# Patient Record
Sex: FEMALE | Race: WHITE | NOT HISPANIC OR LATINO | Employment: OTHER | URBAN - METROPOLITAN AREA
[De-identification: names, ages, dates, MRNs, and addresses within clinical notes are randomized per-mention and may not be internally consistent; named-entity substitution may affect disease eponyms.]

---

## 2020-03-11 ENCOUNTER — HISTORICAL (OUTPATIENT)
Dept: CARDIOLOGY | Facility: HOSPITAL | Age: 70
End: 2020-03-11

## 2023-02-09 ENCOUNTER — OFFICE VISIT (OUTPATIENT)
Dept: URGENT CARE | Facility: CLINIC | Age: 73
End: 2023-02-09
Payer: MEDICARE

## 2023-02-09 ENCOUNTER — TELEPHONE (OUTPATIENT)
Dept: URGENT CARE | Facility: CLINIC | Age: 73
End: 2023-02-09

## 2023-02-09 VITALS
OXYGEN SATURATION: 97 % | HEART RATE: 60 BPM | HEIGHT: 60 IN | DIASTOLIC BLOOD PRESSURE: 90 MMHG | SYSTOLIC BLOOD PRESSURE: 183 MMHG | WEIGHT: 152 LBS | BODY MASS INDEX: 29.84 KG/M2 | TEMPERATURE: 98 F | RESPIRATION RATE: 18 BRPM

## 2023-02-09 DIAGNOSIS — M54.9 BACK PAIN, UNSPECIFIED BACK LOCATION, UNSPECIFIED BACK PAIN LATERALITY, UNSPECIFIED CHRONICITY: Primary | ICD-10-CM

## 2023-02-09 DIAGNOSIS — R10.32 LEFT LOWER QUADRANT ABDOMINAL PAIN: ICD-10-CM

## 2023-02-09 LAB
BILIRUB UR QL STRIP: NEGATIVE
GLUCOSE UR QL STRIP: NEGATIVE
KETONES UR QL STRIP: NEGATIVE
LEUKOCYTE ESTERASE UR QL STRIP: NEGATIVE
OTHER: NEGATIVE
PH, POC UA: 6
POC BLOOD, URINE: NEGATIVE
POC NITRATES, URINE: NEGATIVE
PROT UR QL STRIP: NEGATIVE
SP GR UR STRIP: 1 (ref 1–1.03)
UROBILINOGEN UR STRIP-ACNC: NORMAL (ref 0.1–1.1)

## 2023-02-09 PROCEDURE — 99202 OFFICE O/P NEW SF 15 MIN: CPT | Mod: ,,, | Performed by: PHYSICIAN ASSISTANT

## 2023-02-09 PROCEDURE — 81003 POCT URINALYSIS, DIPSTICK, AUTOMATED, W/O SCOPE: ICD-10-PCS | Mod: QW,,, | Performed by: PHYSICIAN ASSISTANT

## 2023-02-09 PROCEDURE — 81003 URINALYSIS AUTO W/O SCOPE: CPT | Mod: QW,,, | Performed by: PHYSICIAN ASSISTANT

## 2023-02-09 PROCEDURE — 99202 PR OFFICE/OUTPT VISIT, NEW, LEVL II, 15-29 MIN: ICD-10-PCS | Mod: ,,, | Performed by: PHYSICIAN ASSISTANT

## 2023-02-09 RX ORDER — MECLIZINE HCL 12.5 MG 12.5 MG/1
12.5 TABLET ORAL 3 TIMES DAILY
COMMUNITY
Start: 2022-12-28

## 2023-02-09 RX ORDER — FLECAINIDE ACETATE 50 MG/1
50 TABLET ORAL 2 TIMES DAILY
COMMUNITY
Start: 2023-02-05

## 2023-02-09 RX ORDER — AMLODIPINE BESYLATE 5 MG/1
5 TABLET ORAL
COMMUNITY
Start: 2022-12-04

## 2023-02-09 RX ORDER — METOPROLOL TARTRATE 25 MG/1
12.5 TABLET, FILM COATED ORAL 2 TIMES DAILY
COMMUNITY
Start: 2023-02-05

## 2023-02-09 RX ORDER — ATORVASTATIN CALCIUM 80 MG/1
80 TABLET, FILM COATED ORAL
COMMUNITY
Start: 2022-12-18

## 2023-02-09 RX ORDER — ASPIRIN 81 MG/1
81 TABLET ORAL DAILY
COMMUNITY

## 2023-02-09 NOTE — PATIENT INSTRUCTIONS
Recommend go to emergency department today for further evaluation concern for left lower quadrant abdominal pain further evaluation blood work repeat urinalysis and potential imaging CT scan further care planning.

## 2023-02-09 NOTE — PROGRESS NOTES
Subjective:       Patient ID: Deana Rivers is a 72 y.o. female.    Vitals:  height is 5' (1.524 m) and weight is 68.9 kg (152 lb). Her oral temperature is 97.9 °F (36.6 °C). Her blood pressure is 183/90 (abnormal) and her pulse is 60. Her respiration is 18 and oxygen saturation is 97%.     Chief Complaint: No chief complaint on file.    HPI  patient reports in the last 7 days having left lower quadrant pain now worsening overnight difficulty resting waking up with severe sharp left lower quadrant pain radiating to left flank presents to urgent Care for initial evaluation.  Patient reports recent constipation from new salad diet denies recent antibiotic use nausea vomiting or bloody stools.  Patient reports increasing water intake in having urinary frequency without dysuria hematuria.  Patient reports history of kidney stones currently out of hydrocodone and spasm medication   Back Pain     Additional comments: Pt has history of kidney stones. Pt states she has   left lower abdominal pain radiating around to her left lower back.       Constitution: Negative for chills and fever.   Cardiovascular:  Negative for chest pain.   Gastrointestinal:  Positive for abdominal pain and constipation. Negative for nausea, vomiting, diarrhea, bright red blood in stool, dark colored stools and rectal bleeding.   Genitourinary:  Positive for frequency, flank pain and history of kidney stones. Negative for dysuria, urine decreased, bladder incontinence, hematuria and pelvic pain.   Musculoskeletal:  Positive for back pain. Negative for trauma.   Skin: Negative.  Negative for erythema.   Neurological: Negative.    Psychiatric/Behavioral:  Positive for sleep disturbance.      Objective:      Physical Exam   Constitutional: She is oriented to person, place, and time. She appears well-developed. She is cooperative.  Non-toxic appearance.      Comments:Awake alert pleasant ambulatory female     HENT:   Head: Normocephalic.    Mouth/Throat: Oropharynx is clear and moist and mucous membranes are normal.   Eyes: Lids are normal.   Neck: Trachea normal and phonation normal. Neck supple.   Cardiovascular: Normal rate, regular rhythm, normal heart sounds and normal pulses.   Pulmonary/Chest: Effort normal and breath sounds normal. No respiratory distress.   Abdominal: Bowel sounds are normal. She exhibits no distension and no mass. Soft. There is abdominal tenderness. There is guarding. There is no left CVA tenderness and no right CVA tenderness.      Comments: Severe anterior left lower quadrant pain on palpation   Musculoskeletal: Normal range of motion.         General: Normal range of motion.   Neurological: no focal deficit. She is alert and oriented to person, place, and time. She has normal strength and normal reflexes.   Skin: Skin is warm, dry, intact, not diaphoretic, not pale and no rash. No erythema   Psychiatric: Her speech is normal and behavior is normal. Mood, judgment and thought content normal.   Nursing note and vitals reviewed.         Previous History      Review of patient's allergies indicates:   Allergen Reactions    Ace inhibitors      Other reaction(s): Head ache    Clindamycin Hives    Tetanus vaccines and toxoid      Other reaction(s): Swelling       Past Medical History:   Diagnosis Date    Arrhythmia     Hyperlipidemia     Hypertension     Vertigo      Current Outpatient Medications   Medication Instructions    amLODIPine (NORVASC) 5 mg, Oral    aspirin (ECOTRIN) 81 mg, Oral, Daily    atorvastatin (LIPITOR) 80 mg, Oral    flecainide (TAMBOCOR) 50 mg, Oral, 2 times daily    meclizine (ANTIVERT) 12.5 mg, Oral, 3 times daily    metoprolol tartrate (LOPRESSOR) 12.5 mg, Oral, 2 times daily     Past Surgical History:   Procedure Laterality Date    ADENOIDECTOMY      APPENDECTOMY      BREAST BIOPSY Bilateral     CHOLECYSTECTOMY      HYSTERECTOMY      KIDNEY STONE SURGERY Right     KNEE SURGERY Left      TONSILLECTOMY       Family History   Problem Relation Age of Onset    Heart attack Mother     Colon cancer Father        Social History     Tobacco Use    Smoking status: Never    Smokeless tobacco: Never        Physical Exam      Vital Signs Reviewed   BP (!) 183/90   Pulse 60   Temp 97.9 °F (36.6 °C) (Oral)   Resp 18   Ht 5' (1.524 m)   Wt 68.9 kg (152 lb)   SpO2 97%   BMI 29.69 kg/m²        Procedures    Procedures     Labs     Results for orders placed or performed in visit on 02/09/23   POCT Urinalysis, Dipstick, Automated, W/O Scope   Result Value Ref Range    POC Blood, Urine Negative Negative    POC Bilirubin, Urine Negative Negative    POC Urobilinogen, Urine Normal 0.1 - 1.1    POC Ketones, Urine Negative Negative    POC Protein, Urine Negative Negative    POC Nitrates, Urine Negative Negative    POC Glucose, Urine Negative Negative    pH, UA 6     POC Specific Gravity, Urine 1.005 1.003 - 1.029    POC Leukocytes, Urine Negative Negative    OTHER Negative        Assessment:       1. Back pain, unspecified back location, unspecified back pain laterality, unspecified chronicity    2. Left lower quadrant abdominal pain            Plan:     Patient AAOx4 ambulatory since concern for left lower quadrant abdominal pain unclear gastrointestinal versus genitourinary with no acute gross findings on urinalysis in clinic today.  Patient encouraged emergency department evaluation today for further testing and potential imaging.  Patient desires to travel private vehicle to [facility] emergency department for further evaluation.    Recommend go to emergency department today for further evaluation concern for left lower quadrant abdominal pain further evaluation blood work repeat urinalysis and potential imaging CT scan further care planning.  Back pain, unspecified back location, unspecified back pain laterality, unspecified chronicity  -     POCT Urinalysis, Dipstick, Automated, W/O Scope    Left lower  quadrant abdominal pain

## 2023-02-09 NOTE — TELEPHONE ENCOUNTER
Patient called the clinic back shortly after she left stating she spoke with her  who got a recommendation for a urologist. She wanted us to send a referral but after speaking with out provider in house, he will not send the referral because he has already instructed the patient to go to the E.R today. I relayed the message to the patient and she said if it gets worse I will go to the E.R. At this point, I reiterated the instructions to go to the E.R and she acknowledged it and had no further questions.

## 2025-05-19 DIAGNOSIS — G43.909 MIGRAINE: Primary | ICD-10-CM

## 2025-06-09 ENCOUNTER — OFFICE VISIT (OUTPATIENT)
Dept: URGENT CARE | Facility: CLINIC | Age: 75
End: 2025-06-09
Payer: MEDICARE

## 2025-06-09 VITALS
SYSTOLIC BLOOD PRESSURE: 139 MMHG | OXYGEN SATURATION: 96 % | RESPIRATION RATE: 20 BRPM | HEIGHT: 60 IN | WEIGHT: 143 LBS | HEART RATE: 74 BPM | TEMPERATURE: 98 F | BODY MASS INDEX: 28.07 KG/M2 | DIASTOLIC BLOOD PRESSURE: 66 MMHG

## 2025-06-09 DIAGNOSIS — R05.9 COUGH, UNSPECIFIED TYPE: Primary | ICD-10-CM

## 2025-06-09 LAB
CTP QC/QA: YES
CTP QC/QA: YES
POC MOLECULAR INFLUENZA A AGN: NEGATIVE
POC MOLECULAR INFLUENZA B AGN: NEGATIVE
SARS-COV+SARS-COV-2 AG RESP QL IA.RAPID: NEGATIVE

## 2025-06-09 PROCEDURE — 87502 INFLUENZA DNA AMP PROBE: CPT | Mod: QW,,, | Performed by: FAMILY MEDICINE

## 2025-06-09 PROCEDURE — 99213 OFFICE O/P EST LOW 20 MIN: CPT | Mod: ,,, | Performed by: FAMILY MEDICINE

## 2025-06-09 PROCEDURE — 87811 SARS-COV-2 COVID19 W/OPTIC: CPT | Mod: QW,,, | Performed by: FAMILY MEDICINE

## 2025-06-09 RX ORDER — CEPHALEXIN 500 MG/1
500 CAPSULE ORAL EVERY 8 HOURS
Qty: 21 CAPSULE | Refills: 0 | Status: SHIPPED | OUTPATIENT
Start: 2025-06-09 | End: 2025-06-16

## 2025-06-09 NOTE — PATIENT INSTRUCTIONS
Cough:  COVID and flu test are negative  Continue over-the-counter Delsym  Hydrate, increase fluid intake  Rest until you feel better  Call or seek medical attention if you develop any new or worrisome symptoms that arise at home    Cellulitis:  Medication sent to pharmacy, take as prescribed.   Area of infection has been outlined with a marker. Do not wash off markings. Return in 24 hours for evaluation or if you notice redness extends beyond boarders, fever, or drainage/pus.   You may wash the area with antibacterial soap such as dial twice a day until healed. You may take over the counter pain medication such as Tylenol or Ibuprofen as directed.   Follow up or seek immediate care for any new or worsening symptoms at home such as increased pain, swelling, warmth, red streaks from the area, fever, or if you do not get better as expected.

## 2025-06-09 NOTE — PROGRESS NOTES
Subjective:      Patient ID: Deana Rivers is a 74 y.o. female.    Vitals:  height is 5' (1.524 m) and weight is 64.9 kg (143 lb). Her oral temperature is 97.9 °F (36.6 °C). Her blood pressure is 139/66 and her pulse is 74. Her respiration is 20 and oxygen saturation is 96%.     Chief Complaint: Fever     Patient is a 74 y.o. female who presents to urgent care with complaints of rash between breasts, fever of 102.9 F, sinus congestion, cough, sore throat x5 days. Alleviating factors include delsym with moderate amount of relief. Patient denies chest congestion, sob.         Constitution: Positive for fever (Intermittently). Negative for chills, sweating and fatigue.   HENT:  Positive for congestion and sore throat. Negative for ear pain, ear discharge, postnasal drip, sinus pain and sinus pressure.    Neck: neck negative.   Cardiovascular: Negative.    Eyes: Negative.    Respiratory:  Positive for cough.    Gastrointestinal: Negative.    Genitourinary: Negative.    Musculoskeletal: Negative.    Skin:  Positive for rash and erythema.   Allergic/Immunologic: Negative.    Neurological: Negative.    Hematologic/Lymphatic: Negative.    Psychiatric/Behavioral: Negative.        Objective:     Physical Exam   Constitutional: She is oriented to person, place, and time. She appears well-developed. She is cooperative.  Non-toxic appearance. She does not appear ill. No distress.   HENT:   Head: Normocephalic and atraumatic.   Ears:   Right Ear: Hearing, tympanic membrane, external ear and ear canal normal.   Left Ear: Hearing, tympanic membrane, external ear and ear canal normal.   Nose: Congestion present. No mucosal edema, rhinorrhea or nasal deformity. No epistaxis. Right sinus exhibits no maxillary sinus tenderness and no frontal sinus tenderness. Left sinus exhibits no maxillary sinus tenderness and no frontal sinus tenderness.   Mouth/Throat: Uvula is midline, oropharynx is clear and moist and mucous membranes are  normal. No trismus in the jaw. Normal dentition. No uvula swelling. No oropharyngeal exudate, posterior oropharyngeal edema or posterior oropharyngeal erythema.   Eyes: Conjunctivae and lids are normal. No scleral icterus.   Neck: Trachea normal and phonation normal. Neck supple. No edema present. No erythema present. No neck rigidity present.   Cardiovascular: Normal rate, regular rhythm, normal heart sounds and normal pulses.   Pulmonary/Chest: Effort normal and breath sounds normal. No respiratory distress. She has no decreased breath sounds. She has no wheezes. She has no rhonchi. She has no rales.   Abdominal: Normal appearance.   Musculoskeletal: Normal range of motion.         General: No deformity. Normal range of motion.   Neurological: She is alert and oriented to person, place, and time. She exhibits normal muscle tone. Coordination normal.   Skin: Skin is warm, dry, intact, not diaphoretic and not pale. erythema         Comments: There is a circular area with sharp borders the central chest with warmth, there is no abscess, no pustules, no vesicles, no drainage   Psychiatric: Her speech is normal and behavior is normal. Judgment and thought content normal.   Nursing note and vitals reviewed.         Previous History      Review of patient's allergies indicates:   Allergen Reactions    Ace inhibitors      Other reaction(s): Head ache    Clindamycin Hives    Tetanus vaccines and toxoid      Other reaction(s): Swelling       Past Medical History:   Diagnosis Date    Arrhythmia     Hyperlipidemia     Hypertension     Small vessel disease     Vertigo      Current Outpatient Medications   Medication Instructions    amLODIPine (NORVASC) 5 mg    aspirin (ECOTRIN) 81 mg, Daily    atorvastatin (LIPITOR) 80 mg    cephALEXin (KEFLEX) 500 mg, Oral, Every 8 hours    flecainide (TAMBOCOR) 50 mg, 2 times daily    meclizine (ANTIVERT) 12.5 mg, 3 times daily     Past Surgical History:   Procedure Laterality Date     ADENOIDECTOMY      APPENDECTOMY      BREAST BIOPSY Bilateral     CHOLECYSTECTOMY      HYSTERECTOMY      KIDNEY STONE SURGERY Right     KNEE SURGERY Left     TONSILLECTOMY       Family History   Problem Relation Name Age of Onset    Heart attack Mother      Colon cancer Father         Social History[1]     Physical Exam      Vital Signs Reviewed   /66 (BP Location: Right arm, Patient Position: Sitting)   Pulse 74   Temp 97.9 °F (36.6 °C) (Oral)   Resp 20   Ht 5' (1.524 m)   Wt 64.9 kg (143 lb)   SpO2 96%   BMI 27.93 kg/m²        Procedures    Procedures     Labs     Results for orders placed or performed in visit on 06/09/25   POCT Influenza A/B MOLECULAR    Collection Time: 06/09/25  1:54 PM   Result Value Ref Range    POC Molecular Influenza A Ag Negative Negative    POC Molecular Influenza B Ag Negative Negative     Acceptable Yes    SARS Coronavirus 2 Antigen, POCT Manual Read    Collection Time: 06/09/25  1:54 PM   Result Value Ref Range    SARS Coronavirus 2 Antigen Negative Negative, Presumptive Negative     Acceptable Yes       Assessment:     1. Cough, unspecified type        Plan:   Cough:  COVID and flu test are negative  Continue over-the-counter Delsym  Hydrate, increase fluid intake  Rest until you feel better  Call or seek medical attention if you develop any new or worrisome symptoms that arise at home    Cellulitis:  Medication sent to pharmacy, take as prescribed.   Area of infection has been outlined with a marker. Do not wash off markings. Return in 24 hours for evaluation or if you notice redness extends beyond boarders, fever, or drainage/pus.   You may wash the area with antibacterial soap such as dial twice a day until healed. You may take over the counter pain medication such as Tylenol or Ibuprofen as directed.   Follow up or seek immediate care for any new or worsening symptoms at home such as increased pain, swelling, warmth, red streaks from the  area, fever, or if you do not get better as expected.        Cough, unspecified type  -     POCT Influenza A/B MOLECULAR  -     SARS Coronavirus 2 Antigen, POCT Manual Read    Other orders  -     cephALEXin (KEFLEX) 500 MG capsule; Take 1 capsule (500 mg total) by mouth every 8 (eight) hours. for 7 days  Dispense: 21 capsule; Refill: 0                         [1]   Social History  Tobacco Use    Smoking status: Never    Smokeless tobacco: Never   Substance Use Topics    Alcohol use: Not Currently    Drug use: Never

## 2025-06-12 ENCOUNTER — OFFICE VISIT (OUTPATIENT)
Dept: URGENT CARE | Facility: CLINIC | Age: 75
End: 2025-06-12
Payer: MEDICARE

## 2025-06-12 VITALS
WEIGHT: 143 LBS | RESPIRATION RATE: 17 BRPM | HEART RATE: 61 BPM | OXYGEN SATURATION: 96 % | SYSTOLIC BLOOD PRESSURE: 147 MMHG | TEMPERATURE: 98 F | HEIGHT: 60 IN | DIASTOLIC BLOOD PRESSURE: 110 MMHG | BODY MASS INDEX: 28.07 KG/M2

## 2025-06-12 DIAGNOSIS — R21 SKIN RASH: ICD-10-CM

## 2025-06-12 DIAGNOSIS — R05.9 COUGH, UNSPECIFIED TYPE: Primary | ICD-10-CM

## 2025-06-12 DIAGNOSIS — L03.90 CELLULITIS, UNSPECIFIED CELLULITIS SITE: ICD-10-CM

## 2025-06-12 PROCEDURE — 99213 OFFICE O/P EST LOW 20 MIN: CPT | Mod: ,,,

## 2025-06-12 NOTE — PATIENT INSTRUCTIONS
As discussed begin taking oral antibiotics as prescribed, 3 times a day, every 8 hours.  If no relief of redness, rash after completion of antibiotics consistently, call back.  Drink plenty of fluids. Get plenty of rest.    Nasal spray such as Nasacort or Flonase for congestion.    Prescription cough syrup nightly as it may make you drowsy, do not drive while taking.  Prescription cough syrup has antihistamine present, do not combine with other antihistamines or cough medications.  Over-the-counter decongestants such as Sudafed, phenylephrine or pseudoephedrine.  Avoid these if you have a history of high blood pressure.  Warm saltwater gargles for sore throat.  Warm water with honey to help coat the throat.  Throat lozenges.  Chloraseptic spray for worsening sore throat.  Tylenol or ibuprofen as needed for sore throat and fever.  May alternate every 3 hours.    Call or return to clinic as needed   Go to the ER with any significant change or worsening of symptoms.   Follow up with your primary care doctor.

## 2025-06-12 NOTE — PROGRESS NOTES
Subjective:      Patient ID: Deana Rivers is a 74 y.o. female.    Vitals:  height is 5' (1.524 m) and weight is 64.9 kg (143 lb). Her temperature is 98.2 °F (36.8 °C). Her blood pressure is 147/110 (abnormal) and her pulse is 61. Her respiration is 17 and oxygen saturation is 96%.     Chief Complaint: Rash and Follow-up     Patient is a 74 y.o. female who presents to urgent care for re-evaluation.  Patient was seen in clinic 3 days ago, at that time she had cough, URI symptoms, also had rash on the center of her chest.  Was treated as cellulitis with Keflex, prescribed q.8 hours.  However upon talking to the patient , she is only taking twice a day due to confusion.  She does report she feels as though the rash is improving some however not significantly yet.  Cough, nasal congestion, postnasal drip still present, she does report coughing at night no relief with Delsym.  She denies any fever in last 24 hours, reports T-max was 99.5° in the last 2 days since leaving clinic.    Rash    Follow-up  Associated symptoms include a rash.       Skin:  Positive for rash and erythema.      Objective:     Physical Exam   Constitutional: She is oriented to person, place, and time. She appears well-developed.   HENT:   Head: Normocephalic and atraumatic. Head is without abrasion, without contusion and without laceration.   Ears:   Right Ear: External ear normal.   Left Ear: External ear normal.   Nose: Nose normal.   Mouth/Throat: Oropharynx is clear and moist and mucous membranes are normal.   Eyes: Conjunctivae, EOM and lids are normal. Pupils are equal, round, and reactive to light.   Neck: Trachea normal and phonation normal. Neck supple.   Cardiovascular: Normal rate, regular rhythm and normal heart sounds.   Pulmonary/Chest: Effort normal and breath sounds normal. No stridor. No respiratory distress.   Musculoskeletal: Normal range of motion.         General: Normal range of motion.   Neurological: She is alert and  oriented to person, place, and time.   Skin: Skin is warm, dry and intact. Capillary refill takes less than 2 seconds. erythema No abrasion, No burn, No bruising and No ecchymosis         Comments: Area of warmth and erythema noted to central chest between breasts, there is no right red central demarcated rash new skin fold of the breast consistent with yeast etiology, seems more warmth and inflammation, no area of fluctuance or obvious wound, abscess formation noted, no significant increase from previous skin marker region, no vesicles present   Psychiatric: Her speech is normal and behavior is normal. Judgment and thought content normal.   Nursing note and vitals reviewed.      Assessment:     1. Cough, unspecified type    2. Cellulitis, unspecified cellulitis site    3. Skin rash        Plan:       Cough, unspecified type  -     pyrilamine-chlophedianoL 12.5-12.5 mg/5 mL Liqd; Take 10 mLs by mouth every 8 (eight) hours as needed.  Dispense: 180 mL; Refill: 0    Cellulitis, unspecified cellulitis site    Skin rash        She does report some mild improvement in rash between breasts however she is only taking antibiotics every 12 hours for last 2-1/2 days.   Instructed to begin taking antibiotics as prescribed, 3 times a day, q.8 hours.  She is to call back if no relief with this, I did also discussed possible intertrigo however symptoms do not seem consistent with this based on appearance.    As discussed begin taking oral antibiotics as prescribed, 3 times a day, every 8 hours.  If no relief of redness, rash after completion of antibiotics consistently, call back.  Drink plenty of fluids. Get plenty of rest.    Nasal spray such as Nasacort or Flonase for congestion.    Prescription cough syrup nightly as it may make you drowsy, do not drive while taking.  Prescription cough syrup has antihistamine present, do not combine with other antihistamines or cough medications.  Over-the-counter decongestants such as  Sudafed, phenylephrine or pseudoephedrine.  Avoid these if you have a history of high blood pressure.  Warm saltwater gargles for sore throat.  Warm water with honey to help coat the throat.  Throat lozenges.  Chloraseptic spray for worsening sore throat.  Tylenol or ibuprofen as needed for sore throat and fever.  May alternate every 3 hours.    Call or return to clinic as needed   Go to the ER with any significant change or worsening of symptoms.   Follow up with your primary care doctor.